# Patient Record
Sex: FEMALE | Race: WHITE | URBAN - METROPOLITAN AREA
[De-identification: names, ages, dates, MRNs, and addresses within clinical notes are randomized per-mention and may not be internally consistent; named-entity substitution may affect disease eponyms.]

---

## 2021-01-26 ENCOUNTER — TELEPHONE (OUTPATIENT)
Dept: OBGYN CLINIC | Facility: CLINIC | Age: 61
End: 2021-01-26

## 2021-01-26 NOTE — TELEPHONE ENCOUNTER
New pt states that she went to ER at [facility] for trouble urination and vaginal burning. Pt states she was treated for an UTI. Pt states she was not treated for vag burning. Denies discharge. Has a little vag itching.   Pt transferred to [facility] to sched

## 2021-01-26 NOTE — TELEPHONE ENCOUNTER
Pt couldn't decide on an appt, states will call back, per nurse can offer a 20 min res24 slot when pt calls back

## 2021-03-25 ENCOUNTER — OFFICE VISIT (OUTPATIENT)
Dept: OBGYN CLINIC | Facility: CLINIC | Age: 61
End: 2021-03-25
Payer: COMMERCIAL

## 2021-03-25 VITALS
SYSTOLIC BLOOD PRESSURE: 133 MMHG | BODY MASS INDEX: 39.34 KG/M2 | WEIGHT: 170 LBS | HEIGHT: 55 IN | HEART RATE: 97 BPM | DIASTOLIC BLOOD PRESSURE: 77 MMHG

## 2021-03-25 DIAGNOSIS — N94.9 VAGINAL BURNING: ICD-10-CM

## 2021-03-25 DIAGNOSIS — Z01.411 ENCOUNTER FOR GYNECOLOGICAL EXAMINATION WITH ABNORMAL FINDING: Primary | ICD-10-CM

## 2021-03-25 DIAGNOSIS — Z12.31 VISIT FOR SCREENING MAMMOGRAM: ICD-10-CM

## 2021-03-25 PROCEDURE — 99202 OFFICE O/P NEW SF 15 MIN: CPT | Performed by: OBSTETRICS & GYNECOLOGY

## 2021-03-25 PROCEDURE — 3008F BODY MASS INDEX DOCD: CPT | Performed by: OBSTETRICS & GYNECOLOGY

## 2021-03-25 PROCEDURE — 99386 PREV VISIT NEW AGE 40-64: CPT | Performed by: OBSTETRICS & GYNECOLOGY

## 2021-03-25 PROCEDURE — 3078F DIAST BP <80 MM HG: CPT | Performed by: OBSTETRICS & GYNECOLOGY

## 2021-03-25 PROCEDURE — 3075F SYST BP GE 130 - 139MM HG: CPT | Performed by: OBSTETRICS & GYNECOLOGY

## 2021-03-25 RX ORDER — ALPRAZOLAM 0.5 MG/1
0.5 TABLET ORAL NIGHTLY PRN
COMMUNITY

## 2021-03-25 RX ORDER — LISINOPRIL 5 MG/1
5 TABLET ORAL DAILY
COMMUNITY

## 2021-03-25 RX ORDER — CLOBETASOL PROPIONATE 0.5 MG/G
1 OINTMENT TOPICAL 2 TIMES DAILY
Qty: 15 G | Refills: 0 | Status: SHIPPED | OUTPATIENT
Start: 2021-03-25

## 2021-03-26 LAB — HPV I/H RISK 1 DNA SPEC QL NAA+PROBE: NEGATIVE

## 2021-03-26 NOTE — PROGRESS NOTES
Chi Armenta is a 56year old female  No LMP recorded. (Menstrual status: Menopause). Patient presents with:  Gyn Exam: ANNUAL EXAM -- new pt.  PCP at outside location  Other: seeing outside urologist who has been giving estrogen cream to use extern Apply 1 Application topically 2 (two) times daily. , Disp: 15 g, Rfl: 0    ALLERGIES:    Keflex [Cephalexin]     NAUSEA AND VOMITING  Bactrim [Sulfametho*    RASH  Penicillins             RASH      Review of Systems:  Constitutional:    denies fever / chill no hemorroids     Assessment & Plan:  Mandi Fonseca was seen today for gyn exam and other.     Diagnoses and all orders for this visit:    Encounter for gynecological examination with abnormal finding  -     THINPREP PAP SMEAR B; Future  -     HPV HIGH RISK , THIN

## 2021-03-30 LAB — LAST PAP RESULT: NORMAL
